# Patient Record
Sex: FEMALE | Race: WHITE | URBAN - NONMETROPOLITAN AREA
[De-identification: names, ages, dates, MRNs, and addresses within clinical notes are randomized per-mention and may not be internally consistent; named-entity substitution may affect disease eponyms.]

---

## 2020-10-21 ENCOUNTER — WEB ENCOUNTER (OUTPATIENT)
Dept: URBAN - NONMETROPOLITAN AREA CLINIC 2 | Facility: CLINIC | Age: 70
End: 2020-10-21

## 2020-10-21 ENCOUNTER — OFFICE VISIT (OUTPATIENT)
Dept: URBAN - NONMETROPOLITAN AREA CLINIC 2 | Facility: CLINIC | Age: 70
End: 2020-10-21
Payer: MEDICARE

## 2020-10-21 DIAGNOSIS — K43.9: ICD-10-CM

## 2020-10-21 DIAGNOSIS — D49.0 IPMN (INTRADUCTAL PAPILLARY MUCINOUS NEOPLASM): ICD-10-CM

## 2020-10-21 PROCEDURE — G8482 FLU IMMUNIZE ORDER/ADMIN: HCPCS | Performed by: INTERNAL MEDICINE

## 2020-10-21 PROCEDURE — 99213 OFFICE O/P EST LOW 20 MIN: CPT | Performed by: INTERNAL MEDICINE

## 2020-10-21 RX ORDER — CHOLECALCIFEROL (VITD3)/VIT K2 25-90 MCG
TABLET,DISINTEGRATING ORAL
Qty: 0 | Refills: 0 | Status: ACTIVE | COMMUNITY
Start: 1900-01-01

## 2020-10-21 RX ORDER — LISINOPRIL 30 MG/1
TAKE 1 TABLET (30 MG) BY ORAL ROUTE ONCE DAILY TABLET ORAL 1
Qty: 0 | Refills: 0 | Status: ACTIVE | COMMUNITY
Start: 1900-01-01

## 2020-10-21 RX ORDER — INSULIN DETEMIR 100 [IU]/ML
INJECT BY SUBCUTANEOUS ROUTE PER PRESCRIBER'S INSTRUCTIONS. INSULIN DOSING REQUIRES INDIVIDUALIZATION INJECTION, SOLUTION SUBCUTANEOUS
Qty: 1 | Refills: 0 | Status: ACTIVE | COMMUNITY
Start: 1900-01-01

## 2020-10-21 RX ORDER — METFORMIN HYDROCHLORIDE 500 MG/1
TAKE 1 TABLET (500 MG) BY ORAL ROUTE 2 TIMES PER DAY WITH MORNING AND EVENING MEALS TABLET, COATED ORAL 2
Qty: 0 | Refills: 0 | Status: ACTIVE | COMMUNITY
Start: 1900-01-01

## 2020-10-21 RX ORDER — LEVOTHYROXINE, LIOTHYRONINE 9.5; 2.25 UG/1; UG/1
TABLET ORAL
Qty: 0 | Refills: 0 | Status: ACTIVE | COMMUNITY
Start: 1900-01-01

## 2020-10-21 RX ORDER — ALLOPURINOL 100 MG/1
TAKE 1 TABLET (100 MG) BY ORAL ROUTE ONCE DAILY TABLET ORAL 1
Qty: 0 | Refills: 0 | Status: ACTIVE | COMMUNITY
Start: 1900-01-01

## 2020-10-21 NOTE — HPI-TODAY'S VISIT:
Meryl returns for routine follow-up.  Past imaging showed a few small cystic lesions in the pancreas, consistent with branch duct IPMN.  Her main pancreatic duct was normal in caliber.  There were no concerning features.  We decided to repeat serial imaging in 6 months.  She is back for that now.  She feels well.  She does have a recurrent abdominal hernia.  She originally had surgery by Dr. Davies.  She is seeing Dr. Dye who deferred surgical intervention.  She is having more issues with this now.  She is having some recurrent abdominal pain associated with this hernia.  At times this area will herniate and get hard.  It would not resolve.  She would like this evaluated by another surgeon.  She is seeking a second opinion

## 2020-10-21 NOTE — PHYSICAL EXAM GASTROINTESTINAL
Abdomen , soft, nontender, nondistended , no guarding or rigidity , no masses palpable , normal bowel ; ventral hernia just right of midline, redubicle.sounds , Liver and Spleen , no hepatomegaly present  , liver nontender ,

## 2020-10-22 ENCOUNTER — TELEPHONE ENCOUNTER (OUTPATIENT)
Dept: URBAN - METROPOLITAN AREA CLINIC 92 | Facility: CLINIC | Age: 70
End: 2020-10-22

## 2020-10-22 ENCOUNTER — LAB OUTSIDE AN ENCOUNTER (OUTPATIENT)
Dept: URBAN - METROPOLITAN AREA CLINIC 92 | Facility: CLINIC | Age: 70
End: 2020-10-22

## 2022-01-10 ENCOUNTER — WEB ENCOUNTER (OUTPATIENT)
Dept: URBAN - NONMETROPOLITAN AREA CLINIC 2 | Facility: CLINIC | Age: 72
End: 2022-01-10

## 2022-01-10 ENCOUNTER — OFFICE VISIT (OUTPATIENT)
Dept: URBAN - NONMETROPOLITAN AREA CLINIC 2 | Facility: CLINIC | Age: 72
End: 2022-01-10
Payer: MEDICARE

## 2022-01-10 VITALS
DIASTOLIC BLOOD PRESSURE: 80 MMHG | TEMPERATURE: 98 F | BODY MASS INDEX: 41.3 KG/M2 | SYSTOLIC BLOOD PRESSURE: 178 MMHG | WEIGHT: 224.4 LBS | HEIGHT: 62 IN | HEART RATE: 80 BPM

## 2022-01-10 DIAGNOSIS — K43.9: ICD-10-CM

## 2022-01-10 DIAGNOSIS — D49.0 IPMN (INTRADUCTAL PAPILLARY MUCINOUS NEOPLASM): ICD-10-CM

## 2022-01-10 DIAGNOSIS — K76.0 HEPATIC STEATOSIS: ICD-10-CM

## 2022-01-10 PROCEDURE — 99214 OFFICE O/P EST MOD 30 MIN: CPT | Performed by: NURSE PRACTITIONER

## 2022-01-10 RX ORDER — LISINOPRIL 30 MG/1
TAKE 1 TABLET (30 MG) BY ORAL ROUTE ONCE DAILY TABLET ORAL 1
Qty: 0 | Refills: 0 | COMMUNITY
Start: 1900-01-01

## 2022-01-10 RX ORDER — METFORMIN HYDROCHLORIDE 500 MG/1
TAKE 1 TABLET (500 MG) BY ORAL ROUTE 2 TIMES PER DAY WITH MORNING AND EVENING MEALS TABLET, COATED ORAL 2
Qty: 0 | Refills: 0 | COMMUNITY
Start: 1900-01-01

## 2022-01-10 RX ORDER — ALLOPURINOL 100 MG/1
TAKE 1 TABLET (100 MG) BY ORAL ROUTE ONCE DAILY TABLET ORAL 1
Qty: 0 | Refills: 0 | COMMUNITY
Start: 1900-01-01

## 2022-01-10 RX ORDER — LEVOTHYROXINE, LIOTHYRONINE 9.5; 2.25 UG/1; UG/1
TABLET ORAL
Qty: 0 | Refills: 0 | COMMUNITY
Start: 1900-01-01

## 2022-01-10 RX ORDER — INSULIN DETEMIR 100 [IU]/ML
INJECT BY SUBCUTANEOUS ROUTE PER PRESCRIBER'S INSTRUCTIONS. INSULIN DOSING REQUIRES INDIVIDUALIZATION INJECTION, SOLUTION SUBCUTANEOUS
Qty: 1 | Refills: 0 | COMMUNITY
Start: 1900-01-01

## 2022-01-10 RX ORDER — CHOLECALCIFEROL (VITD3)/VIT K2 25-90 MCG
TABLET,DISINTEGRATING ORAL
Qty: 0 | Refills: 0 | COMMUNITY
Start: 1900-01-01

## 2022-01-10 NOTE — HPI-OTHER HISTORIES
10/21/2020 Meryl returns for routine follow-up.  Past imaging showed a few small cystic lesions in the pancreas, consistent with branch duct IPMN.  Her main pancreatic duct was normal in caliber.  There were no concerning features.  We decided to repeat serial imaging in 6 months.  She is back for that now.  She feels well.  She does have a recurrent abdominal hernia.  She originally had surgery by Dr. Davies.  She is seeing Dr. Dye who deferred surgical intervention.  She is having more issues with this now.  She is having some recurrent abdominal pain associated with this hernia.  At times this area will herniate and get hard.  It would not resolve.  She would like this evaluated by another surgeon.  She is seeking a second opinion

## 2022-01-10 NOTE — PHYSICAL EXAM GASTROINTESTINAL
Abdomen , soft, nontender, nondistended , no guarding or rigidity , no masses palpable , normal bowel sounds , upper abdominal hernia and lower abdominal hernia. Nontender, nonreducable. Liver and Spleen , no hepatosplenomegaly , liver nontender , spleen not palpable

## 2022-01-10 NOTE — HPI-TODAY'S VISIT:
1/10/2022 Meryl is here to discussed recent CT scan ordered by Dr Marquez regarding pancreatic cyst and hepatic steatosis. Past imaging showed a few small cystic lesions in the pancreas, consistent with branch duct IPMN.  Her main pancreatic duct was normal in caliber.  There were no concerning features. She had a repeat MRI 11/2020 that showed 3 small cysts in the pancreas- largest 1cm without ductal dilatation.  Since her last OV, she has surgery with Dr Marquez to repair her  recurrent abdominal hernia. She has had a return of abdominal pain and abdominal hernia. She had a CT scan to evaluate this 12/2021. This showed a normal pancreas and mild hepatic steatosis. Along with a large ventral hernia. Dr Marquez has requested she work on weight loss over the next 3 months adn then have surgery. She denies any hx of elevated liver tests. Her MRI 11/2020 showed diffuse hepatic steatosis as well. She had a family member die of NAFLD. She denies any alcohol hx. She is an insulin dependant diabetic. CS

## 2022-01-11 LAB
ALBUMIN: 4.5
ALKALINE PHOSPHATASE: 75
ALT (SGPT): 47
AST (SGOT): 24
BILIRUBIN, DIRECT: 0.11
BILIRUBIN, TOTAL: 0.3
BUN/CREATININE RATIO: 28
BUN: 19
CARBON DIOXIDE, TOTAL: 24
CHLORIDE: 100
CREATININE: 0.67
EGFR IF AFRICN AM: 104
EGFR IF NONAFRICN AM: 90
GLUCOSE: 188
HEMATOCRIT: 41.7
HEMOGLOBIN: 14.1
INR: 0.9
MCH: 30.5
MCHC: 33.8
MCV: 90
NRBC: (no result)
PLATELETS: 159
POTASSIUM: 4
PROTEIN, TOTAL: 7.2
PROTHROMBIN TIME: 10
RBC: 4.63
RDW: 13.4
SODIUM: 141
WBC: 5.9

## 2022-05-02 ENCOUNTER — LAB OUTSIDE AN ENCOUNTER (OUTPATIENT)
Dept: URBAN - NONMETROPOLITAN AREA CLINIC 2 | Facility: CLINIC | Age: 72
End: 2022-05-02

## 2022-07-13 ENCOUNTER — DASHBOARD ENCOUNTERS (OUTPATIENT)
Age: 72
End: 2022-07-13

## 2022-07-13 ENCOUNTER — OFFICE VISIT (OUTPATIENT)
Dept: URBAN - NONMETROPOLITAN AREA CLINIC 2 | Facility: CLINIC | Age: 72
End: 2022-07-13
Payer: MEDICARE

## 2022-07-13 VITALS
SYSTOLIC BLOOD PRESSURE: 155 MMHG | HEART RATE: 82 BPM | WEIGHT: 226 LBS | HEIGHT: 62 IN | TEMPERATURE: 97.9 F | BODY MASS INDEX: 41.59 KG/M2 | DIASTOLIC BLOOD PRESSURE: 86 MMHG

## 2022-07-13 DIAGNOSIS — D49.0 IPMN (INTRADUCTAL PAPILLARY MUCINOUS NEOPLASM): ICD-10-CM

## 2022-07-13 DIAGNOSIS — K43.9: ICD-10-CM

## 2022-07-13 DIAGNOSIS — K76.0 HEPATIC STEATOSIS: ICD-10-CM

## 2022-07-13 PROCEDURE — 99214 OFFICE O/P EST MOD 30 MIN: CPT | Performed by: NURSE PRACTITIONER

## 2022-07-13 RX ORDER — ALLOPURINOL 100 MG/1
TAKE 1 TABLET (100 MG) BY ORAL ROUTE ONCE DAILY TABLET ORAL 1
Qty: 0 | Refills: 0 | Status: ACTIVE | COMMUNITY
Start: 1900-01-01

## 2022-07-13 RX ORDER — BLOOD-GLUCOSE SENSOR
USE 1 SENSOR AND CHANGE EVERY 10 DAYS AS DIRECTED EACH MISCELLANEOUS
Qty: 3 EACH | Refills: 1 | Status: ACTIVE | COMMUNITY

## 2022-07-13 RX ORDER — METFORMIN HYDROCHLORIDE 500 MG/1
TAKE 1 TABLET (500 MG) BY ORAL ROUTE 2 TIMES PER DAY WITH MORNING AND EVENING MEALS TABLET, COATED ORAL 2
Qty: 0 | Refills: 0 | Status: ACTIVE | COMMUNITY
Start: 1900-01-01

## 2022-07-13 RX ORDER — TELMISARTAN 80 MG/1
TABLET ORAL
Qty: 90 TABLET | Status: ACTIVE | COMMUNITY

## 2022-07-13 NOTE — HPI-OTHER HISTORIES
10/21/2020 Meryl returns for routine follow-up.  Past imaging showed a few small cystic lesions in the pancreas, consistent with branch duct IPMN.  Her main pancreatic duct was normal in caliber.  There were no concerning features.  We decided to repeat serial imaging in 6 months.  She is back for that now.  She feels well.  She does have a recurrent abdominal hernia.  She originally had surgery by Dr. Davies.  She is seeing Dr. Dye who deferred surgical intervention.  She is having more issues with this now.  She is having some recurrent abdominal pain associated with this hernia.  At times this area will herniate and get hard.  It would not resolve.  She would like this evaluated by another surgeon.  She is seeking a second opinion  1/10/2022 Meryl is here to discussed recent CT scan ordered by Dr Marquez regarding pancreatic cyst and hepatic steatosis. Past imaging showed a few small cystic lesions in the pancreas, consistent with branch duct IPMN.  Her main pancreatic duct was normal in caliber.  There were no concerning features. She had a repeat MRI 11/2020 that showed 3 small cysts in the pancreas- largest 1cm without ductal dilatation.  Since her last OV, she has surgery with Dr Marquez to repair her  recurrent abdominal hernia. She has had a return of abdominal pain and abdominal hernia. She had a CT scan to evaluate this 12/2021. This showed a normal pancreas and mild hepatic steatosis. Along with a large ventral hernia. Dr Marquez has requested she work on weight loss over the next 3 months adn then have surgery. She denies any hx of elevated liver tests. Her MRI 11/2020 showed diffuse hepatic steatosis as well. She had a family member die of NAFLD. She denies any alcohol hx. She is an insulin dependant diabetic. CS

## 2022-07-13 NOTE — HPI-TODAY'S VISIT:
7/13/2022 Meryl is here to discussed recent CT scan ordered by Dr Marquez regarding pancreatic cyst and hepatic steatosis. Past imaging showed a few small cystic lesions in the pancreas, consistent with branch duct IPMN.  Her main pancreatic duct was normal in caliber.  There were no concerning features. She had a repeat MRI 11/2020 that showed 3 small cysts in the pancreas- largest 1cm without ductal dilatation. She had a repeat MRI 6/2022 that was stable. She was going to have surgery with Dr Marquez to repair her  recurrent abdominal hernia but this was decided againist given her anatomy and return of hernia after last repair. She denies any abdominal pain.  Her liver tests were up at her last OV likely due to NAFL. We discussed chronic liver work up and she wanted to wait. She has changed her diet and her blood sugars have been well controlled. Her blood work last month showed normal liver tests. Her weight is stable. CS

## 2022-07-14 PROBLEM — 197321007: Status: ACTIVE | Noted: 2022-01-10

## 2024-07-17 ENCOUNTER — OFFICE VISIT (OUTPATIENT)
Dept: URBAN - NONMETROPOLITAN AREA CLINIC 2 | Facility: CLINIC | Age: 74
End: 2024-07-17
Payer: MEDICARE

## 2024-07-17 ENCOUNTER — LAB OUTSIDE AN ENCOUNTER (OUTPATIENT)
Dept: URBAN - NONMETROPOLITAN AREA CLINIC 2 | Facility: CLINIC | Age: 74
End: 2024-07-17

## 2024-07-17 VITALS
DIASTOLIC BLOOD PRESSURE: 77 MMHG | SYSTOLIC BLOOD PRESSURE: 161 MMHG | WEIGHT: 231 LBS | BODY MASS INDEX: 42.51 KG/M2 | HEIGHT: 62 IN | HEART RATE: 91 BPM

## 2024-07-17 DIAGNOSIS — K76.0 HEPATIC STEATOSIS: ICD-10-CM

## 2024-07-17 DIAGNOSIS — D49.0 IPMN (INTRADUCTAL PAPILLARY MUCINOUS NEOPLASM): ICD-10-CM

## 2024-07-17 DIAGNOSIS — K43.9: ICD-10-CM

## 2024-07-17 PROCEDURE — 99213 OFFICE O/P EST LOW 20 MIN: CPT | Performed by: NURSE PRACTITIONER

## 2024-07-17 RX ORDER — BLOOD-GLUCOSE SENSOR
USE 1 SENSOR AND CHANGE EVERY 10 DAYS AS DIRECTED EACH MISCELLANEOUS
Qty: 3 EACH | Refills: 1 | Status: ACTIVE | COMMUNITY

## 2024-07-17 RX ORDER — TIRZEPATIDE 15 MG/.5ML
INJECTION, SOLUTION SUBCUTANEOUS
Qty: 2 UNSPECIFIED | Status: ACTIVE | COMMUNITY

## 2024-07-17 RX ORDER — INSULIN ASPART 100 [IU]/ML
INJECTION, SUSPENSION SUBCUTANEOUS
Qty: 180 UNSPECIFIED | Status: ACTIVE | COMMUNITY

## 2024-07-17 RX ORDER — BUPROPION HYDROCHLORIDE 150 MG/1
TABLET, EXTENDED RELEASE ORAL
Qty: 90 TABLET | Status: ACTIVE | COMMUNITY

## 2024-07-17 RX ORDER — METFORMIN HYDROCHLORIDE 500 MG/1
TAKE 1 TABLET (500 MG) BY ORAL ROUTE 2 TIMES PER DAY WITH MORNING AND EVENING MEALS TABLET, COATED ORAL 2
Qty: 0 | Refills: 0 | Status: ON HOLD | COMMUNITY
Start: 1900-01-01

## 2024-07-17 RX ORDER — TELMISARTAN 80 MG/1
TABLET ORAL
Qty: 90 TABLET | Status: ACTIVE | COMMUNITY

## 2024-07-17 RX ORDER — ALLOPURINOL 100 MG/1
TAKE 1 TABLET (100 MG) BY ORAL ROUTE ONCE DAILY TABLET ORAL 1
Qty: 0 | Refills: 0 | Status: ON HOLD | COMMUNITY
Start: 1900-01-01

## 2024-07-17 NOTE — HPI-OTHER HISTORIES
10/21/2020 Meryl returns for routine follow-up.  Past imaging showed a few small cystic lesions in the pancreas, consistent with branch duct IPMN.  Her main pancreatic duct was normal in caliber.  There were no concerning features.  We decided to repeat serial imaging in 6 months.  She is back for that now.  She feels well.  She does have a recurrent abdominal hernia.  She originally had surgery by Dr. Davies.  She is seeing Dr. Dye who deferred surgical intervention.  She is having more issues with this now.  She is having some recurrent abdominal pain associated with this hernia.  At times this area will herniate and get hard.  It would not resolve.  She would like this evaluated by another surgeon.  She is seeking a second opinion  1/10/2022 Meryl is here to discussed recent CT scan ordered by Dr Marquez regarding pancreatic cyst and hepatic steatosis. Past imaging showed a few small cystic lesions in the pancreas, consistent with branch duct IPMN.  Her main pancreatic duct was normal in caliber.  There were no concerning features. She had a repeat MRI 11/2020 that showed 3 small cysts in the pancreas- largest 1cm without ductal dilatation.  Since her last OV, she has surgery with Dr Marquez to repair her  recurrent abdominal hernia. She has had a return of abdominal pain and abdominal hernia. She had a CT scan to evaluate this 12/2021. This showed a normal pancreas and mild hepatic steatosis. Along with a large ventral hernia. Dr Marquez has requested she work on weight loss over the next 3 months adn then have surgery. She denies any hx of elevated liver tests. Her MRI 11/2020 showed diffuse hepatic steatosis as well. She had a family member die of NAFLD. She denies any alcohol hx. She is an insulin dependant diabetic. CS  7/13/2022 Meryl is here to discussed recent CT scan ordered by Dr Marquez regarding pancreatic cyst and hepatic steatosis. Past imaging showed a few small cystic lesions in the pancreas, consistent with branch duct IPMN. Her main pancreatic duct was normal in caliber. There were no concerning features. She had a repeat MRI 11/2020 that showed 3 small cysts in the pancreas- largest 1cm without ductal dilatation. She had a repeat MRI 6/2022 that was stable. She was going to have surgery with Dr Marquez to repair her recurrent abdominal hernia but this was decided againist given her anatomy and return of hernia after last repair. She denies any abdominal pain. Her liver tests were up at her last OV likely due to NAFL. We discussed chronic liver work up and she wanted to wait. She has changed her diet and her blood sugars have been well controlled. Her blood work last month showed normal liver tests. Her weight is stable. CS

## 2024-07-27 LAB
ALT (SGPT): 21
AST (SGOT): 17
ENHANCED LIVER FIBROSIS (ELF) SCORE: 8.58
FIB 4 INDEX: 1.59
FIB 4 INTERPRETATION: (no result)
PLATELETS: 166

## 2024-08-05 ENCOUNTER — TELEPHONE ENCOUNTER (OUTPATIENT)
Dept: URBAN - NONMETROPOLITAN AREA CLINIC 2 | Facility: CLINIC | Age: 74
End: 2024-08-05

## 2024-08-05 ENCOUNTER — LAB OUTSIDE AN ENCOUNTER (OUTPATIENT)
Dept: URBAN - NONMETROPOLITAN AREA CLINIC 2 | Facility: CLINIC | Age: 74
End: 2024-08-05